# Patient Record
(demographics unavailable — no encounter records)

---

## 2025-06-24 NOTE — HISTORY OF PRESENT ILLNESS
[FreeTextEntry1] : Hidradenitis [de-identified] : NPA here with HS x years Failed ILK, topicals, multiple courses PO abx Finally achieved good control on Humira, however patient has now been off Humira  for several months due to insurance change and disruption in care Patient is flaring today particularly in b/l axillae. declines exam of other areas

## 2025-06-24 NOTE — PHYSICAL EXAM
[FreeTextEntry3] :  Gen:                        Well appearing, well nourished, NAD. Skin:                       Eccrine:                 Within normal limits                 Face:                      Neck:                              UE:                         LE:                             Neuro:                     No focal deficits. Age appropriate. Psych:                     Appropriate affect.

## 2025-06-24 NOTE — ASSESSMENT
[FreeTextEntry1] : -- NPA here with HS x years Failed ILK, topicals, multiple courses PO abx Finally achieved good control on Humira, however patient has now been off Humira  for several months due to insurance change and disruption in care Patient is flaring today particularly in b/l axillae. declines exam of other areas  #Hidradenitis Suppurativa - chronic with flare (Banuelos stage III b/l axillae) -Lifestyle measures -Topicals: restart hibiclens wash/clinda daily with bathing -Abx: pt defers additional PO abx at this time. has not helped. caused SE (GI) -Hormonal: pt defers in favor of restarting Humira which was more helpful, has IUD -Immuno: Pt would like to restart Humira since it greatly improved her HS in the past. r/b/a discussed                       -labs: quant and hep panel today                       -will reach out to prior auth specialty team                       -plan to resume Humira, restarting with loading dose:                        -Initial: SUBQ: 160 mg (day 1). Then 80 mg 2 weeks later (day 15).                       -Maintenance: 40 mg every week beginning day 29  -ILK: pt defers, states has been a painful but not helpful tx in past  #PIH - RLE chronic -trial amlactin -sun protection

## 2025-07-09 NOTE — ASSESSMENT
[FreeTextEntry1] : -- HS x years Failed ILK, topicals, multiple courses PO abx Finally achieved good control on Humira, however patient has now been off Humira  for several months due to insurance change and disruption in care Patient is flaring today particularly in b/l axillae Patient has been approved for Humira with plan to receive the med tomorrow labs OK new problem today - solitary lesion of AA on posterior scalp  #Hidradenitis Suppurativa - chronic with flare (Banuelos stage III b/l axillae) -Lifestyle measures -Topicals: restart hibiclens wash/clinda daily with bathing -Abx: pt defers additional PO abx at this time. has not helped. caused SE (GI) -Hormonal: pt defers in favor of restarting Humira which was more helpful, has IUD -Immuno:       -plan to resume Humira, restarting with loading dose:                        -Initial: SUBQ: 160 mg (day 1). Then 80 mg 2 weeks later (day 15).                       -Maintenance: 40 mg every week beginning day 29  -ILK: pt defers due to SE pain. may reconsider pending course  #AA - new prob uncertain px -coin shaped lesion hair loss posterior scalp -defers ILK today (pain) - reconsider NOV -start beta dip oint BID to AA x 1 mo and reassess  #PIH - RLE chronic -trial amlactin -sun protection

## 2025-07-09 NOTE — PHYSICAL EXAM
[FreeTextEntry3] :  Gen:                        Well appearing, well nourished, NAD. Skin:                       Eccrine:                 Within normal limits               Scalp:   Hair: Face:                      Neck:                              UE:                                                   Neuro:                     No focal deficits. Age appropriate. Psych:                     Appropriate affect.

## 2025-07-09 NOTE — HISTORY OF PRESENT ILLNESS
[FreeTextEntry1] : Hidradenitis [de-identified] : NPA here with HS x years Failed ILK, topicals, multiple courses PO abx Finally achieved good control on Humira, however patient has now been off Humira  for several months due to insurance change and disruption in care Patient is flaring today particularly in b/l axillae. Patient has been approved for Humira with plan to receive the [facility] tomorrow labs OK new problem today - solitary lesion of AA on posterior scalp